# Patient Record
Sex: FEMALE | Race: BLACK OR AFRICAN AMERICAN | NOT HISPANIC OR LATINO | URBAN - METROPOLITAN AREA
[De-identification: names, ages, dates, MRNs, and addresses within clinical notes are randomized per-mention and may not be internally consistent; named-entity substitution may affect disease eponyms.]

---

## 2018-07-02 ENCOUNTER — EMERGENCY (EMERGENCY)
Facility: HOSPITAL | Age: 63
LOS: 0 days | Discharge: HOME | End: 2018-07-03
Attending: EMERGENCY MEDICINE
Payer: COMMERCIAL

## 2018-07-02 VITALS
OXYGEN SATURATION: 100 % | DIASTOLIC BLOOD PRESSURE: 80 MMHG | SYSTOLIC BLOOD PRESSURE: 148 MMHG | HEIGHT: 66 IN | RESPIRATION RATE: 16 BRPM | TEMPERATURE: 99 F | WEIGHT: 184.97 LBS | HEART RATE: 94 BPM

## 2018-07-02 DIAGNOSIS — M25.561 PAIN IN RIGHT KNEE: ICD-10-CM
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
## 2018-07-02 DIAGNOSIS — S83.91XA SPRAIN OF UNSPECIFIED SITE OF RIGHT KNEE, INITIAL ENCOUNTER: ICD-10-CM

## 2018-07-02 DIAGNOSIS — I10 ESSENTIAL (PRIMARY) HYPERTENSION: ICD-10-CM
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
## 2018-07-02 PROCEDURE — 99283 EMERGENCY DEPT VISIT LOW MDM: CPT

## 2018-07-02 NOTE — ED ADULT TRIAGE NOTE - CHIEF COMPLAINT QUOTE
Pt is here for a fall. Pt states she slid down three stairs this morning on her right side and her foot bent backwards. Pt is complaining of pain in her left lower extremity. Denies hitting her head, no LOC, denies blood thinners.

## 2018-07-03 DIAGNOSIS — Z98.890 OTHER SPECIFIED POSTPROCEDURAL STATES: Chronic | ICD-10-CM

## 2018-07-03 PROCEDURE — 73560 X-RAY EXAM OF KNEE 1 OR 2: CPT | Mod: 26,RT

## 2018-07-03 RX ORDER — IBUPROFEN 200 MG
600 TABLET ORAL ONCE
Qty: 0 | Refills: 0 | Status: COMPLETED | OUTPATIENT
Start: 2018-07-03 | End: 2018-07-03

## 2018-07-03 RX ORDER — IBUPROFEN 200 MG
2 TABLET ORAL
Qty: 40 | Refills: 0 | OUTPATIENT
Start: 2018-07-03 | End: 2018-07-07

## 2018-07-03 RX ADMIN — Medication 600 MILLIGRAM(S): at 00:21

## 2018-07-03 RX ADMIN — Medication 600 MILLIGRAM(S): at 01:23

## 2018-07-03 NOTE — ED PROVIDER NOTE - OBJECTIVE STATEMENT
Pertinent PMH/PSH/FHx/SHx and Review of Systems contained within:  61 yo f with PMH of htn presents in ED c/o right knee pain s/p sliding down 3 steps this am. no head trauma, no loc. No aggravating or relieving factors, No fever/chills, No photophobia/eye pain/changes in vision, No ear pain/sore throat/dysphagia, No chest pain/palpitations, no SOB/cough/wheeze/stridor, No abdominal pain, No N/V/D, no dysuria/frequency/discharge, No neck/back pain, no rash, no changes in neurological status/function.

## 2018-07-03 NOTE — ED PROVIDER NOTE - MEDICAL DECISION MAKING DETAILS
imaging reviewed. patient received ibu. ace bandage placed and patient provided with cane. she is currently in good condition and discharged with care instructions. patient will follow up with ortho, this week. Discussed results and outcome of testing with the patient.  Patient advised to please follow up with their primary care doctor within the next 24 hours and return to the Emergency Department for worsening symptoms or any other concerns.  Patient advised that their doctor may call  to follow up on the specific results of the tests performed today in the emergency department.

## 2018-07-03 NOTE — ED PROVIDER NOTE - MUSCULOSKELETAL, MLM
mild lateral edema of right knee, rom intact, stable joint, neurovascularly intact, able to bear weight

## 2018-07-03 NOTE — ED ADULT NURSE NOTE - OBJECTIVE STATEMENT
pt c/o of right knee pain 8/10. fall today 1100 slipped on stairs. pt denies taking blood thinners, denies loc, hitting head. Pt fell on side hitting right knee.

## 2022-10-24 NOTE — ED ADULT TRIAGE NOTE - NS ED NURSE BANDS TYPE
Patient here for port access for PET scan. Port accessed by resource RN per advocate Perez procedure and protocol. Patient tolerated procedure well. Patient had PET scan and was de accessed by treatment RNSemaj without incident.     Patient discharged in stable condition via wheelchair accompanied by spouse.  
Name band;